# Patient Record
Sex: FEMALE | Race: WHITE
[De-identification: names, ages, dates, MRNs, and addresses within clinical notes are randomized per-mention and may not be internally consistent; named-entity substitution may affect disease eponyms.]

---

## 2018-02-16 ENCOUNTER — HOSPITAL ENCOUNTER (OUTPATIENT)
Dept: HOSPITAL 89 - RAD | Age: 28
End: 2018-02-16
Attending: SURGERY
Payer: COMMERCIAL

## 2018-02-16 DIAGNOSIS — R10.9: Primary | ICD-10-CM

## 2018-02-16 DIAGNOSIS — K58.9: ICD-10-CM

## 2018-02-16 PROCEDURE — 74250 X-RAY XM SM INT 1CNTRST STD: CPT

## 2018-02-16 NOTE — RADIOLOGY IMAGING REPORT
FACILITY: [facility] 

 

PATIENT NAME: Wendie Rivera

: 1990

MR: [id number]

V: [id number]

EXAM DATE: 

ORDERING PHYSICIAN: JOHN ULLRICH

TECHNOLOGIST: 

 

Location: [facility]

Patient: Wendie Rivera

: 1990

MRN: [id number]

Visit/Account:[id number]

Date of Sevice:  2018

 

ACCESSION #: [id number]

 

SMALL BOWEL SERIES

 

HISTORY:  Abdominal pain.  Irritable bowel syndrome.

 

COMPARISON:  CT abdomen and pelvis 2014

 

PROCEDURE: A  abdominal radiograph was obtained.  This was followed by oral administration of th
in barium, and a series of abdominal radiographs until the barium migrated from the stomach to the co
neymar.  The terminal ileum was assessed under fluoroscopy by the radiologist.

 

FINDINGS:

 radiograph: Average bowel gas pattern.

 

Small bowel transit time is 45 minutes.  Small bowel is uniform in size, and without focal thickening
, stricture, or mass effect.  The terminal ileum appears normal.  The ileocecal junction is located i
n the right lower abdominal quadrant at the level of the right iliac crest.

 

IMPRESSION:

Normal small bowel follow-through.

 

Fluoroscopy time: 0.6 minute

DAP: 326.05 uGym2

 

Report Dictated By: Amee Barrera MD at 2018 1:37 PM

 

Report E-Signed By: Amee Barrera MD  at 2018 1:45 PM

 

WSN:[id number]